# Patient Record
Sex: MALE | Race: OTHER | NOT HISPANIC OR LATINO | Employment: OTHER | URBAN - METROPOLITAN AREA
[De-identification: names, ages, dates, MRNs, and addresses within clinical notes are randomized per-mention and may not be internally consistent; named-entity substitution may affect disease eponyms.]

---

## 2018-09-19 ENCOUNTER — APPOINTMENT (EMERGENCY)
Dept: CT IMAGING | Facility: HOSPITAL | Age: 73
End: 2018-09-19

## 2018-09-19 ENCOUNTER — HOSPITAL ENCOUNTER (EMERGENCY)
Facility: HOSPITAL | Age: 73
Discharge: HOME | End: 2018-09-19
Attending: EMERGENCY MEDICINE
Payer: MEDICAID

## 2018-09-19 VITALS
HEART RATE: 99 BPM | HEIGHT: 62 IN | SYSTOLIC BLOOD PRESSURE: 114 MMHG | OXYGEN SATURATION: 95 % | RESPIRATION RATE: 20 BRPM | BODY MASS INDEX: 27.83 KG/M2 | DIASTOLIC BLOOD PRESSURE: 59 MMHG | WEIGHT: 151.24 LBS | TEMPERATURE: 98 F

## 2018-09-19 DIAGNOSIS — R33.9 URINARY RETENTION: Primary | ICD-10-CM

## 2018-09-19 LAB
ALBUMIN SERPL BCP-MCNC: 3.8 G/DL (ref 3.5–5)
ALP SERPL-CCNC: 209 U/L (ref 46–116)
ALT SERPL W P-5'-P-CCNC: 32 U/L (ref 12–78)
ANION GAP SERPL CALCULATED.3IONS-SCNC: 10 MMOL/L (ref 4–13)
AST SERPL W P-5'-P-CCNC: 22 U/L (ref 5–45)
ATRIAL RATE: 134 BPM
BACTERIA UR QL AUTO: ABNORMAL /HPF
BASOPHILS # BLD AUTO: 0.05 THOUSANDS/ΜL (ref 0–0.1)
BASOPHILS NFR BLD AUTO: 1 % (ref 0–1)
BILIRUB SERPL-MCNC: 0.8 MG/DL (ref 0.2–1)
BILIRUB UR QL STRIP: NEGATIVE
BUN SERPL-MCNC: 24 MG/DL (ref 5–25)
CALCIUM SERPL-MCNC: 8.7 MG/DL (ref 8.3–10.1)
CHLORIDE SERPL-SCNC: 101 MMOL/L (ref 100–108)
CLARITY UR: ABNORMAL
CO2 SERPL-SCNC: 28 MMOL/L (ref 21–32)
COLOR UR: YELLOW
CREAT SERPL-MCNC: 1.19 MG/DL (ref 0.6–1.3)
EOSINOPHIL # BLD AUTO: 0.13 THOUSAND/ΜL (ref 0–0.61)
EOSINOPHIL NFR BLD AUTO: 1 % (ref 0–6)
ERYTHROCYTE [DISTWIDTH] IN BLOOD BY AUTOMATED COUNT: 15 % (ref 11.6–15.1)
GFR SERPL CREATININE-BSD FRML MDRD: 61 ML/MIN/1.73SQ M
GLUCOSE SERPL-MCNC: 109 MG/DL (ref 65–140)
GLUCOSE UR STRIP-MCNC: NEGATIVE MG/DL
HCT VFR BLD AUTO: 42.4 % (ref 36.5–49.3)
HGB BLD-MCNC: 13.8 G/DL (ref 12–17)
HGB UR QL STRIP.AUTO: ABNORMAL
IMM GRANULOCYTES # BLD AUTO: 0.04 THOUSAND/UL (ref 0–0.2)
IMM GRANULOCYTES NFR BLD AUTO: 0 % (ref 0–2)
KETONES UR STRIP-MCNC: NEGATIVE MG/DL
LEUKOCYTE ESTERASE UR QL STRIP: NEGATIVE
LYMPHOCYTES # BLD AUTO: 2.06 THOUSANDS/ΜL (ref 0.6–4.47)
LYMPHOCYTES NFR BLD AUTO: 20 % (ref 14–44)
MCH RBC QN AUTO: 30 PG (ref 26.8–34.3)
MCHC RBC AUTO-ENTMCNC: 32.5 G/DL (ref 31.4–37.4)
MCV RBC AUTO: 92 FL (ref 82–98)
MONOCYTES # BLD AUTO: 1.38 THOUSAND/ΜL (ref 0.17–1.22)
MONOCYTES NFR BLD AUTO: 14 % (ref 4–12)
NEUTROPHILS # BLD AUTO: 6.56 THOUSANDS/ΜL (ref 1.85–7.62)
NEUTS SEG NFR BLD AUTO: 64 % (ref 43–75)
NITRITE UR QL STRIP: NEGATIVE
NON-SQ EPI CELLS URNS QL MICRO: ABNORMAL /HPF
NRBC BLD AUTO-RTO: 0 /100 WBCS
P AXIS: 8 DEGREES
PH UR STRIP.AUTO: 5.5 [PH] (ref 4.5–8)
PLATELET # BLD AUTO: 235 THOUSANDS/UL (ref 149–390)
PMV BLD AUTO: 10 FL (ref 8.9–12.7)
POTASSIUM SERPL-SCNC: 4.4 MMOL/L (ref 3.5–5.3)
PR INTERVAL: 192 MS
PROT SERPL-MCNC: 7.8 G/DL (ref 6.4–8.2)
PROT UR STRIP-MCNC: NEGATIVE MG/DL
QRS AXIS: 137 DEGREES
QRSD INTERVAL: 70 MS
QT INTERVAL: 270 MS
QTC INTERVAL: 403 MS
RBC # BLD AUTO: 4.6 MILLION/UL (ref 3.88–5.62)
RBC #/AREA URNS AUTO: ABNORMAL /HPF
SODIUM SERPL-SCNC: 139 MMOL/L (ref 136–145)
SP GR UR STRIP.AUTO: 1.01 (ref 1–1.03)
T WAVE AXIS: -8 DEGREES
UROBILINOGEN UR QL STRIP.AUTO: 0.2 E.U./DL
VENTRICULAR RATE: 134 BPM
WBC # BLD AUTO: 10.22 THOUSAND/UL (ref 4.31–10.16)
WBC #/AREA URNS AUTO: ABNORMAL /HPF

## 2018-09-19 PROCEDURE — 36415 COLL VENOUS BLD VENIPUNCTURE: CPT | Performed by: EMERGENCY MEDICINE

## 2018-09-19 PROCEDURE — 81001 URINALYSIS AUTO W/SCOPE: CPT | Performed by: EMERGENCY MEDICINE

## 2018-09-19 PROCEDURE — 85025 COMPLETE CBC W/AUTO DIFF WBC: CPT | Performed by: EMERGENCY MEDICINE

## 2018-09-19 PROCEDURE — 74177 CT ABD & PELVIS W/CONTRAST: CPT

## 2018-09-19 PROCEDURE — 93005 ELECTROCARDIOGRAM TRACING: CPT

## 2018-09-19 PROCEDURE — 80053 COMPREHEN METABOLIC PANEL: CPT | Performed by: EMERGENCY MEDICINE

## 2018-09-19 PROCEDURE — 93010 ELECTROCARDIOGRAM REPORT: CPT | Performed by: INTERNAL MEDICINE

## 2018-09-19 PROCEDURE — 99284 EMERGENCY DEPT VISIT MOD MDM: CPT

## 2018-09-19 RX ORDER — METHIMAZOLE 5 MG/1
5 TABLET ORAL 2 TIMES DAILY
COMMUNITY

## 2018-09-19 RX ORDER — FUROSEMIDE 20 MG/1
20 TABLET ORAL 2 TIMES DAILY
COMMUNITY

## 2018-09-19 RX ORDER — METOPROLOL SUCCINATE 25 MG/1
25 TABLET, EXTENDED RELEASE ORAL DAILY
COMMUNITY

## 2018-09-19 RX ADMIN — IOHEXOL 100 ML: 350 INJECTION, SOLUTION INTRAVENOUS at 17:13

## 2018-09-19 NOTE — ED NOTES
Spoke with patient over  and he verbally understood that he needs to contact his MD first thing tomorrow regarding the staples catheter that is placed  Spoke with son "Wilfred Norwood" and he is on his way here and going to be here in 45 min to  patient  Son verbally understood what the father needs to do as a followup and will see to that they call  MD states that we are to leave in the staples cath       Aisha Norris RN  09/19/18 6869

## 2018-09-19 NOTE — ED PROVIDER NOTES
History  Chief Complaint   Patient presents with    Difficulty Urinating     patient states that he is unable to urinate in that last 4 hours      80-year-old male presents for urinary retention  He states this has happened in the past secondary to enlarged prostate  States that for the past 4 hr he has been unable to urinate and he is developing pressure and bladder secondary to this  No perianal sensation loss  No numbness or tingling  He denies any fevers or chills, denies recent trauma to the abdomen  He denies dysuria or pain on urination, just has the inability to urinate at this time  No penile or testicular pain  Prior to Admission Medications   Prescriptions Last Dose Informant Patient Reported? Taking? Fluticasone Furoate-Vilanterol (BREO ELLIPTA IN)   Yes Yes   Sig: Inhale 1 Inhaler   Rivaroxaban (XARELTO PO)   Yes Yes   Sig: Take 15 mg by mouth daily   Tamsulosin HCl (FLOMAX PO)   Yes Yes   Sig: Take 0 4 mg by mouth daily   furosemide (LASIX) 20 mg tablet   Yes Yes   Sig: Take 20 mg by mouth 2 (two) times a day   methimazole (TAPAZOLE) 5 mg tablet   Yes Yes   Sig: Take 5 mg by mouth 2 (two) times a day   metoprolol succinate (TOPROL-XL) 25 mg 24 hr tablet   Yes Yes   Sig: Take 25 mg by mouth daily      Facility-Administered Medications: None       Past Medical History:   Diagnosis Date    Hypertension        History reviewed  No pertinent surgical history  History reviewed  No pertinent family history  I have reviewed and agree with the history as documented  Social History   Substance Use Topics    Smoking status: Never Smoker    Smokeless tobacco: Never Used    Alcohol use No        Review of Systems   Constitutional: Negative for chills and fever  HENT: Negative for congestion and rhinorrhea  Respiratory: Positive for shortness of breath (Chronic secondary to trauma as a child)  Negative for chest tightness and stridor      Cardiovascular: Negative for chest pain and leg swelling  Gastrointestinal: Negative for abdominal pain, nausea and vomiting  Genitourinary: Positive for difficulty urinating  Negative for discharge, dysuria, flank pain, penile pain and testicular pain  Musculoskeletal: Negative for back pain, neck pain and neck stiffness  Skin: Negative for rash and wound  Neurological: Negative for dizziness, weakness, numbness and headaches  Psychiatric/Behavioral: Negative for confusion  Physical Exam  Physical Exam   Constitutional: He is oriented to person, place, and time  He appears well-developed and well-nourished  No distress  HENT:   Head: Normocephalic and atraumatic  Mouth/Throat: Oropharynx is clear and moist    Eyes: Conjunctivae and EOM are normal    Neck: Normal range of motion  Neck supple  Cardiovascular: Regular rhythm  Mild tachycardia which is baseline for the patient  Pulmonary/Chest: Breath sounds normal  He is in respiratory distress (Baseline increased respiratory effort after a traumatic injury to his spine as a child  No change from baseline  )  He exhibits no tenderness  Abdominal: Soft  He exhibits no distension  There is no tenderness  There is no guarding  Genitourinary: Penis normal  No penile tenderness ( no penile discharge, rash, no abnormalities noted to the testicles  Taveras is placed without incident)  Musculoskeletal: Normal range of motion  Neurological: He is alert and oriented to person, place, and time  No cranial nerve deficit or sensory deficit  Skin: Skin is warm and dry  He is not diaphoretic  No pallor  Psychiatric: He has a normal mood and affect  His behavior is normal    Vitals reviewed        Vital Signs  ED Triage Vitals [09/19/18 1534]   Temperature Pulse Respirations Blood Pressure SpO2   98 °F (36 7 °C) (!) 124 22 (!) 149/105 92 %      Temp Source Heart Rate Source Patient Position - Orthostatic VS BP Location FiO2 (%)   Oral Monitor Lying Right arm --      Pain Score Worst Possible Pain           Vitals:    09/19/18 1534 09/19/18 1800 09/19/18 1805 09/19/18 1830   BP: (!) 149/105 109/64 109/64 114/59   Pulse: (!) 124 100 96 99   Patient Position - Orthostatic VS: Lying  Lying        Visual Acuity      ED Medications  Medications   iohexol (OMNIPAQUE) 350 MG/ML injection (MULTI-DOSE) 100 mL (100 mL Intravenous Given 9/19/18 1713)       Diagnostic Studies  Results Reviewed     Procedure Component Value Units Date/Time    Urine Microscopic [[id number]]  (Abnormal) Collected:  09/19/18 1622    Lab Status:  Final result Specimen:  Urine from Urine, Clean Catch Updated:  09/19/18 1639     RBC, UA 20-30 (A) /hpf      WBC, UA None Seen /hpf      Epithelial Cells None Seen /hpf      Bacteria, UA None Seen /hpf     UA w Reflex to Microscopic w Reflex to Culture [[id number]]  (Abnormal) Collected:  09/19/18 1622    Lab Status:  Final result Specimen:  Urine from Urine, Clean Catch Updated:  09/19/18 1630     Color, UA Yellow     Clarity, UA Slightly Cloudy     Specific [place], UA 1 010     pH, UA 5 5     Leukocytes, UA Negative     Nitrite, UA Negative     Protein, UA Negative mg/dl      Glucose, UA Negative mg/dl      Ketones, UA Negative mg/dl      Urobilinogen, UA 0 2 E U /dl      Bilirubin, UA Negative     Blood, UA Large (A)    Comprehensive metabolic panel [[id number]]  (Abnormal) Collected:  09/19/18 1600    Lab Status:  Final result Specimen:  Blood from Arm, Left Updated:  09/19/18 1626     Sodium 139 mmol/L      Potassium 4 4 mmol/L      Chloride 101 mmol/L      CO2 28 mmol/L      ANION GAP 10 mmol/L      BUN 24 mg/dL      Creatinine 1 19 mg/dL      Glucose 109 mg/dL      Calcium 8 7 mg/dL      AST 22 U/L      ALT 32 U/L      Alkaline Phosphatase 209 (H) U/L      Total Protein 7 8 g/dL      Albumin 3 8 g/dL      Total Bilirubin 0 80 mg/dL      eGFR 61 ml/min/1 73sq m     Narrative:         National Kidney Disease Education Program recommendations are as follows:  GFR calculation is accurate only with a steady state creatinine  Chronic Kidney disease less than 60 ml/min/1 73 sq  meters  Kidney failure less than 15 ml/min/1 73 sq  meters  CBC and differential [[id number]]  (Abnormal) Collected:  09/19/18 1600    Lab Status:  Final result Specimen:  Blood from Arm, Left Updated:  09/19/18 1606     WBC 10 22 (H) Thousand/uL      RBC 4 60 Million/uL      Hemoglobin 13 8 g/dL      Hematocrit 42 4 %      MCV 92 fL      MCH 30 0 pg      MCHC 32 5 g/dL      RDW 15 0 %      MPV 10 0 fL      Platelets 481 Thousands/uL      nRBC 0 /100 WBCs      Neutrophils Relative 64 %      Immat GRANS % 0 %      Lymphocytes Relative 20 %      Monocytes Relative 14 (H) %      Eosinophils Relative 1 %      Basophils Relative 1 %      Neutrophils Absolute 6 56 Thousands/µL      Immature Grans Absolute 0 04 Thousand/uL      Lymphocytes Absolute 2 06 Thousands/µL      Monocytes Absolute 1 38 (H) Thousand/µL      Eosinophils Absolute 0 13 Thousand/µL      Basophils Absolute 0 05 Thousands/µL                  CT abdomen pelvis with contrast   ED Interpretation by Erwin Morrow DO (09/19 1753)   Extreme focal kyphosis lower thoracic spine/thoracolumbar junction with multiple fused vertebral bodies  Narrowing of the thecal sac as it crosses posterior to the extremely kyphotic lower thoracic segment        Small pleural effusions        Mild cardiomegaly        Enlarged prostate        Urinary bladder is collapsed by Taveras catheter  Final Result by Gin Stahl DO (09/19 1750)   Extreme focal kyphosis lower thoracic spine/thoracolumbar junction with multiple fused vertebral bodies  Narrowing of the thecal sac as it crosses posterior to the extremely kyphotic lower thoracic segment  Small pleural effusions  Mild cardiomegaly  Enlarged prostate  Urinary bladder is collapsed by Taveras catheter        Workstation performed: [id number]                    Procedures  Procedures       Phone Contacts  ED Phone Contact    ED Course  ED Course as of Sep 20 1513   Wed Sep 19, 2018   1644 No evidence of infection urine  1644 No evidence of acute kidney disease  MDM  Number of Diagnoses or Management Options  Urinary retention:   Diagnosis management comments: Urinary retention  No loss of sensation in the perirectal area  No concern for cauda equina  Patient has a history of BPH and states this has happened in the past   He has a catheter placed  Blood work was performed to ensure normal kidney function  There is no evidence of urinary tract infection, only blood in the urine likely secondary to traumatic placement of the Staples  CT scan does not indicate any other signs of infection or other urinary disease  Patient is advised follow-up with Urology in has the Staples left in place until Urology follow-up  Amount and/or Complexity of Data Reviewed  Clinical lab tests: ordered and reviewed  Tests in the radiology section of CPT®: ordered and reviewed      CritCare Time    Disposition  Final diagnoses:   Urinary retention     Time reflects when diagnosis was documented in both MDM as applicable and the Disposition within this note     Time User Action Codes Description Comment    9/19/2018  5:49 PM Pedro Luis Irvin Laramie Add [R33 9] Urinary retention       ED Disposition     ED Disposition Condition Comment    Discharge  Suhail Lam discharge to home/self care      Condition at discharge: Good        Follow-up Information     Follow up With Specialties Details Why Contact Info Additional [address] [facility] Emergency Department Emergency Medicine  If symptoms worsen: fever,chills, abdominal pain, unable to urinate through staples, etc [address] [facility] [address] ED, [address] [place] [address], [phone]    Ariana Blancas MD Urology Schedule an appointment as soon as possible for a visit [address]  [phone]             Discharge Medication List as of 9/19/2018  5:52 PM      CONTINUE these medications which have NOT CHANGED    Details   Fluticasone Furoate-Vilanterol (BREO ELLIPTA IN) Inhale 1 Inhaler, Historical Med      furosemide (LASIX) 20 mg tablet Take 20 mg by mouth 2 (two) times a day, Historical Med      methimazole (TAPAZOLE) 5 mg tablet Take 5 mg by mouth 2 (two) times a day, Historical Med      metoprolol succinate (TOPROL-XL) 25 mg 24 hr tablet Take 25 mg by mouth daily, Historical Med      Rivaroxaban (XARELTO PO) Take 15 mg by mouth daily, Historical Med      Tamsulosin HCl (FLOMAX PO) Take 0 4 mg by mouth daily, Historical Med           No discharge procedures on file      ED Provider  Electronically Signed by           Kim Mann DO  09/20/18 9675

## 2018-09-19 NOTE — DISCHARGE INSTRUCTIONS
Urinary Retention in Men   WHAT YOU NEED TO KNOW:   What is urinary retention? Urinary retention is a condition that develops when your bladder does not empty completely when you urinate  What causes urinary retention? · An enlarged prostate    · Blockages, such as a stone, growth, or narrowing of your urethra    · A weak bladder muscle    · Nerve damage from diabetes, stroke, or spinal cord injury    · Bladder diverticula, which are pockets of urine that form in your bladder and do not empty    · Certain medicines, such as narcotics, antihistamines, or antidepressants  What are the signs and symptoms of urinary retention? · Frequent urination, or the urge to urinate right after you finish    · An urge to urinate, but your urine does not come out or dribbles out slowly and weakly    · Frequent urine leaks that happen during the day or while you sleep    · Pain or pressure when you urinate    · Pain or stiffness in your abdomen, lower back, hips, or upper thighs    · Blood in your urine  How is urinary retention diagnosed? Your healthcare provider will ask about your health history and the medicines you take  He will press or tap on your lower abdomen  You may need any of the following tests:  · A digital rectal exam  is when healthcare providers carefully feel the size of your prostate  · A post void residual  test will show how much urine is left in your bladder after you urinate  You will be asked to urinate and then healthcare providers will use a small ultrasound machine to check how much urine is left in your bladder  · Blood or urine tests  may show infection or prostate specific antigen (PSA) levels  PSA may be elevated in prostate cancer  · An ultrasound  uses sound waves to show pictures on a monitor  An ultrasound may be done to show bladder stones, infection, or other problems  · A CT scan , or CAT scan, is a type of x-ray that is taken of your prostate, kidneys, and bladder  The pictures may show what is causing your urinary retention  You may be given a dye before the pictures are taken to help healthcare providers see the pictures better  Tell the healthcare provider if you have ever had an allergic reaction to contrast dye  How is urinary retention treated? · A Taveras catheter  is a tube put into your bladder to drain urine into a bag  Keep the bag below your waist  This will prevent urine from flowing back into your bladder and causing an infection or other problems  Also, keep the tube free of kinks so the urine will drain properly  Do not pull on the catheter  This can cause pain and bleeding, and may cause the catheter to come out  · Medicines  can help decrease the size of your prostate, fight infection, and help you urinate more easily  · Surgery  may be needed to treat the condition that is causing your urinary retention  When should I contact my healthcare provider? · You have a fever  · You have pain when you urinate  · You have blood in your urine  · You have problems with your catheter  · You have questions or concerns about your condition or care  When should I seek immediate care or call 911? · You have severe abdominal pain  · You are breathing faster than usual     · Your heartbeat is faster than usual     · Your face, hands, feet, or ankles are swollen  CARE AGREEMENT:   You have the right to help plan your care  Learn about your health condition and how it may be treated  Discuss treatment options with your caregivers to decide what care you want to receive  You always have the right to refuse treatment  The above information is an  only  It is not intended as medical advice for individual conditions or treatments  Talk to your doctor, nurse or pharmacist before following any medical regimen to see if it is safe and effective for you    © 2017 [employer] Information is for End User's use only and may not be sold, redistributed or otherwise used for commercial purposes  All illustrations and images included in CareNotes® are the copyrighted property of A D A M [employer]  or Chuck Barber  Taveras Catheter Placement and Care   WHAT YOU NEED TO KNOW:   A Taveras catheter is a sterile tube that is inserted into your bladder to drain urine  It is also called an indwelling urinary catheter  The tip of the catheter has a small balloon filled with solution that holds the catheter inside your bladder  DISCHARGE INSTRUCTIONS:   Return to the emergency department if:   · Your catheter comes out  · You suddenly have material that looks like sand in the tubing or drainage bag  · No urine is draining into the bag and you have checked the system  · You have pain in your hip, back, pelvis, or lower abdomen  · You are confused or cannot think clearly  Contact your healthcare provider if:   · You have a fever  · You have bladder spasms for more than 1 day after the catheter is placed  · You see blood in the tubing or drainage bag  · You have a rash or itching where the catheter tube is secured to your skin  · Urine leaks from or around the catheter, tubing, or drainage bag  · The closed drainage system has accidently come open or apart  · You see a layer of crystals inside the tubing  · You have questions or concerns about your condition or care  Care for your Taveras catheter:   · Clean your genital area 2 times every day  Clean your catheter and the area around where it was inserted  Use soap and water  Clean your anal opening and catheter area after every bowel movement  · Secure the catheter tube  so you do not pull or move the catheter  This helps prevent pain and bladder spasms  Healthcare providers will show you how to use medical tape or a strap to secure the catheter tube to your body  · Keep a closed drainage system    Your Taveras catheter should always be attached to the drainage bag to form a closed system  Do not disconnect any part of the closed system unless you need to change the bag  Care for your drainage bag:   · Ask if a leg bag is right for you  A leg bag can be worn under your clothes  Ask your healthcare provider for more information about a leg bag  · Keep the drainage bag below the level of your waist   This helps stop urine from moving back up the tubing and into your bladder  Do not loop or kink the tubing  This can cause urine to back up and collect in your bladder  Do not let the drainage bag touch or lie on the floor  · Empty the drainage bag when needed  The weight of a full drainage bag can be painful  Empty the drainage bag every 3 to 6 hours or when it is ? full  · Clean and change the drainage bag as directed  Ask your healthcare provider how often you should change the drainage bag and what cleaning solution to use  Wear disposable gloves when you change the bag  Do not allow the end of the catheter or tubing to touch anything  Clean the ends with an alcohol pad before you reconnect them  What to do if problems develop:   · No urine is draining into the bag:      ¨ Check for kinks in the tubing and straighten them out  ¨ Check the tape or strap used to secure the catheter tube to your skin  Make sure it is not blocking the tube  ¨ Make sure you are not sitting or lying on the tubing  ¨ Make sure the urine bag is hanging below the level of your waist     · Urine leaks from or around the catheter, tubing, or drainage bag:  Check if the closed drainage system has accidently come open or apart  Clean the catheter and tubing ends with a new alcohol pad and reconnect them  Prevent an infection:   · Wash your hands often  Wash before and after you touch your catheter, tubing, or drainage bag  Use soap and water  Wear clean disposable gloves when you care for your catheter or disconnect the drainage bag   Wash your hands before you prepare or eat food  · Drink liquids as directed  Ask your healthcare provider how much liquid to drink each day and which liquids are best for you  Liquids will help flush your kidneys and bladder to help prevent infection  Follow up with your healthcare provider as directed:  Write down your questions so you remember to ask them during your visits  © 2017 [address] [employer] Information is for End User's use only and may not be sold, redistributed or otherwise used for commercial purposes  All illustrations and images included in CareNotes® are the copyrighted property of [employer]  or Chuck Barber  The above information is an  only  It is not intended as medical advice for individual conditions or treatments  Talk to your doctor, nurse or pharmacist before following any medical regimen to see if it is safe and effective for you

## 2018-10-04 ENCOUNTER — HOSPITAL ENCOUNTER (EMERGENCY)
Facility: HOSPITAL | Age: 73
Discharge: HOME | End: 2018-10-04
Attending: EMERGENCY MEDICINE | Admitting: EMERGENCY MEDICINE
Payer: MEDICAID

## 2018-10-04 VITALS
WEIGHT: 149.91 LBS | HEART RATE: 95 BPM | OXYGEN SATURATION: 93 % | BODY MASS INDEX: 27.42 KG/M2 | RESPIRATION RATE: 15 BRPM | TEMPERATURE: 98.6 F | DIASTOLIC BLOOD PRESSURE: 90 MMHG | SYSTOLIC BLOOD PRESSURE: 146 MMHG

## 2018-10-04 DIAGNOSIS — B02.9 HERPES ZOSTER: Primary | ICD-10-CM

## 2018-10-04 PROCEDURE — 99283 EMERGENCY DEPT VISIT LOW MDM: CPT

## 2018-10-04 RX ORDER — VALACYCLOVIR HYDROCHLORIDE 1 G/1
1000 TABLET, FILM COATED ORAL 3 TIMES DAILY
Qty: 21 TABLET | Refills: 0 | Status: SHIPPED | OUTPATIENT
Start: 2018-10-04 | End: 2018-10-04

## 2018-10-04 RX ORDER — VALACYCLOVIR HYDROCHLORIDE 1 G/1
1000 TABLET, FILM COATED ORAL 3 TIMES DAILY
Qty: 21 TABLET | Refills: 0 | Status: SHIPPED | OUTPATIENT
Start: 2018-10-04 | End: 2018-10-11

## 2018-10-04 RX ORDER — VALACYCLOVIR HYDROCHLORIDE 500 MG/1
1000 TABLET, FILM COATED ORAL ONCE
Status: COMPLETED | OUTPATIENT
Start: 2018-10-04 | End: 2018-10-04

## 2018-10-04 RX ORDER — ERYTHROMYCIN 5 MG/G
0.5 OINTMENT OPHTHALMIC ONCE
Status: COMPLETED | OUTPATIENT
Start: 2018-10-04 | End: 2018-10-04

## 2018-10-04 RX ORDER — ERYTHROMYCIN 5 MG/G
OINTMENT OPHTHALMIC
Qty: 3.5 G | Refills: 0 | Status: SHIPPED | OUTPATIENT
Start: 2018-10-04

## 2018-10-04 RX ORDER — ERYTHROMYCIN 5 MG/G
OINTMENT OPHTHALMIC
Qty: 3.5 G | Refills: 0 | Status: SHIPPED | OUTPATIENT
Start: 2018-10-04 | End: 2018-10-04

## 2018-10-04 RX ADMIN — VALACYCLOVIR HYDROCHLORIDE 1000 MG: 500 TABLET, FILM COATED ORAL at 22:24

## 2018-10-04 RX ADMIN — ERYTHROMYCIN 0.5 INCH: 5 OINTMENT OPHTHALMIC at 22:31

## 2018-10-05 NOTE — DISCHARGE INSTRUCTIONS
Please followup with opthamology  Please return if any worsening of symptoms, vision changes, worsening rash, fever, headaches  Take the antivirals as described  followup with your pcp for reevaluation as well  Shingles   WHAT YOU NEED TO KNOW:   What is shingles? Shingles is a painful rash  Shingles is caused by the same virus that causes chickenpox (varicella-zoster)  After you get chickenpox, the virus stays in your body for several years without causing any symptoms  Shingles occurs when the virus becomes active again  Once active, the virus will travel along a nerve to your skin and cause a rash  What are the signs and symptoms of shingles? Shingles often starts with pain in the back, chest, neck, or face  A rash then develops in the same area  The rash is usually found on only one side of the body  The rash may feel itchy or painful  It starts as red dots that become blisters filled with fluid  The blisters usually grow bigger, become filled with pus, and then crust over after a few days  You may also have any of the following:  · Fatigue and muscle weakness    · Pain when your skin is lightly touched    · Headache    · Fever    · Eye pain when exposed to light  What increases my risk for shingles? · Age older than 48    · Exposure to the virus while your mother was pregnant with you    · A medical condition such as cancer, AIDS, or Hodgkin disease     · Treatment for cancer that decreases your immune system    · Stressful life events that weaken your immune system    · An organ or stem cell transplant  How is shingles diagnosed? Your healthcare provider will ask about your symptoms  Tell him if you have had chickenpox  Tell him if you have recently been around anyone who has chickenpox or shingles  The appearance of your rash will usually be enough for your healthcare provider to know you have shingles  He may also send skin scrapings or fluid from your blisters for tests     How is shingles treated? · Antiviral medicine  helps decrease symptoms and healing time  It may also decrease your risk for nerve pain  You will need to start taking this medicine within 3 days of the start of symptoms to prevent nerve pain  · Pain medicine  may be prescribed or suggested by your healthcare provider  You may need NSAIDs, acetaminophen, or opioid medicine depending on how much pain you are in  · Topical anesthetics  are used to numb the skin and decrease pain  They can be a cream, gel, spray, or patch  · Anticonvulsants  decrease nerve pain and may help you sleep at night  · Antidepressants  may be used to decrease nerve pain  · Epidural medicine  is put into your spine to block pain  This medicine treats severe pain that does not get better with other pain medicines  Epidural medicine includes numbing medicine and steroids  Can I infect others? The virus can be passed to a person who has never had chickenpox  This person may get chickenpox, but not shingles  You may pass the virus to others as long as you have a rash  The virus is spread by direct contact with the fluid from the blisters  Usually, you cannot spread the virus once the blisters dry up  What are the risks of shingles? If left untreated, shingles may cause eye problems, such as a drooping eyelid or blindness  It may lead to a brain infection or stroke  Shingles can also cause nerve damage and lead to twitching, dizziness, or loss of taste and hearing  The blisters may leave scars or changes in skin color  Shingles may cause pain even after the rash is gone  It may also lead to trouble moving parts of your body  How can I care for myself? Keep your rash clean and dry  Cover your rash with a bandage or clothing  Do not use bandages with adhesive  These may irritate your skin and make your rash last longer  What can I do to help prevent shingles or a shingles outbreak? A vaccine may be given to help prevent shingles   Ask for more information about this vaccine  Where can I find more information about shingles? · Centers for Disease Control and Prevention  [address] , [address]  Phone: 1- 062 - 9859495  Phone: 4- 414 - [phone]  Web Address: [employer] com   When should I seek immediate care? · You have painful, red, warm skin around the blisters, or the blisters drain pus  · Your neck is stiff or you have trouble moving it  · You have trouble moving your arms, legs, or face  · You have a seizure  · You have weakness in an arm or leg  · You become confused, or have difficulty speaking  · You have dizziness, a severe headache, or hearing or vision loss  When should I contact my healthcare provider? · You feel weak or have a headache  · You have a cough, chills, or a fever  · You have abdominal pain or nausea, or you are vomiting  · Your rash becomes more itchy or painful  · Your rash spreads to other parts of your body  · Your pain worsens and does not go away even after you take medicine  · You have questions or concerns about your condition or care  CARE AGREEMENT:   You have the right to help plan your care  Learn about your health condition and how it may be treated  Discuss treatment options with your caregivers to decide what care you want to receive  You always have the right to refuse treatment  The above information is an  only  It is not intended as medical advice for individual conditions or treatments  Talk to your doctor, nurse or pharmacist before following any medical regimen to see if it is safe and effective for you  © 2017 [address] [employer] Information is for End User's use only and may not be sold, redistributed or otherwise used for commercial purposes  All illustrations and images included in CareNotes® are the copyrighted property of [employer]  or Chuck [employer]

## 2018-10-05 NOTE — ED PROVIDER NOTES
History  Chief Complaint   Patient presents with    Rash     Red, raised rash on right side of face and scalp, painful with movement and occasionally itchy  Onset yesterday, unresponsive to benadryl at home  Denies fevers  HPI    This is a 68 year male that presents today with a rash on the right side of his face  Patient states since yesterday evening he fell vesicles on the top portion of his forehead  He states painful and occasionally itchy  He has been placing Benadryl along with steroid cream with no response  Patient denies any visual changes  No hearing loss  Denies any fevers or chills  Patient is not a diabetic states he is healthy at baseline  Currently states he has been taking ibuprofen for pain  Denies any other symptoms  No eye pain  No vesicles over the nose  68 year male that presents today with appears to be shingles  Patient does have some redness of his right eye without any pain  Patient has no consensual photophobia  Normal extraocular movements  Normal pupil  Patient has no lesions around his nose or inner ear  Will treat with acyclovir since patient is within the 72 hour window  Patient needs follow-up with Ophthalmology which I discussed with the patient  Will give drops conjunctivitis  Advised to return if any worsening of symptoms  Prior to Admission Medications   Prescriptions Last Dose Informant Patient Reported? Taking?    Fluticasone Furoate-Vilanterol (BREO ELLIPTA IN)   Yes Yes   Sig: Inhale 1 Inhaler   Rivaroxaban (XARELTO PO)   Yes Yes   Sig: Take 15 mg by mouth daily   Tamsulosin HCl (FLOMAX PO)   Yes Yes   Sig: Take 0 4 mg by mouth daily   furosemide (LASIX) 20 mg tablet   Yes Yes   Sig: Take 20 mg by mouth 2 (two) times a day   methimazole (TAPAZOLE) 5 mg tablet   Yes Yes   Sig: Take 5 mg by mouth 2 (two) times a day   metoprolol succinate (TOPROL-XL) 25 mg 24 hr tablet   Yes Yes   Sig: Take 25 mg by mouth daily      Facility-Administered Medications: None       Past Medical History:   Diagnosis Date    Atrial fibrillation ([address] )     [facility]     Disease of thyroid gland     Hypertension     Kyphosis        History reviewed  No pertinent surgical history  History reviewed  No pertinent family history  I have reviewed and agree with the history as documented  Social History   Substance Use Topics    Smoking status: Never Smoker    Smokeless tobacco: Never Used    Alcohol use No        Review of Systems   Constitutional: Negative  Negative for diaphoresis and fever  HENT: Negative  Respiratory: Negative  Negative for cough, shortness of breath and wheezing  Cardiovascular: Negative  Negative for chest pain, palpitations and leg swelling  Gastrointestinal: Negative for abdominal distention, abdominal pain, nausea and vomiting  Genitourinary: Negative  Musculoskeletal: Negative  Skin: Positive for rash  Neurological: Negative  Psychiatric/Behavioral: Negative  All other systems reviewed and are negative  Physical Exam  Physical Exam   Constitutional: He is oriented to person, place, and time  He appears well-developed and well-nourished  No distress  HENT:   Head: Normocephalic and atraumatic  Nose: Nose normal    Mouth/Throat: Oropharynx is clear and moist    Right upper forehead vesicles with some crusting extending from the eyebrow to the scalp  No vesicles appreciated on the ear outside and inside  No vesicles of the nose  Patient has conjunctivitis of his right eye with normal visual fields  No pain with extraocular movements  Pain with light  Patient has no visual deficits  Normal visual fields  No other lesions appreciated  Eyes: Pupils are equal, round, and reactive to light  Conjunctivae and EOM are normal    Neck: Normal range of motion  Neck supple  Cardiovascular: Normal rate, regular rhythm and normal heart sounds      No murmur heard   Pulmonary/Chest: Effort normal and breath sounds normal  No respiratory distress  He has no wheezes  He has no rales  Abdominal: Soft  Bowel sounds are normal  He exhibits no distension  There is no tenderness  There is no rebound and no guarding  Musculoskeletal: Normal range of motion  He exhibits no edema, tenderness or deformity  Neurological: He is alert and oriented to person, place, and time  No cranial nerve deficit  Skin: Skin is warm and dry  No rash noted  He is not diaphoretic  No pallor  Psychiatric: He has a normal mood and affect  Vitals reviewed  Vital Signs  ED Triage Vitals [10/04/18 2213]   Temperature Pulse Respirations Blood Pressure SpO2   98 6 °F (37 °C) 95 15 146/90 93 %      Temp Source Heart Rate Source Patient Position - Orthostatic VS BP Location FiO2 (%)   Oral Monitor Lying Right arm --      Pain Score       4           Vitals:    10/04/18 2213   BP: 146/90   Pulse: 95   Patient Position - Orthostatic VS: Lying       Visual Acuity  Visual Acuity      Most Recent Value   Visual acuity R eye is  20/40   Visual acuity Left eye is  20/40   Visual acuity in both eyes is  20/40   Wearing corrective eyewear/lenses? No [Pt states he wers glasses normally but did not wear them tonight ]          ED Medications  Medications   valACYclovir (VALTREX) tablet 1,000 mg (1,000 mg Oral Given 10/4/18 2224)   erythromycin (ILOTYCIN) 0 5 % ophthalmic ointment 0 5 inch (0 5 inches Right Eye Given 10/4/18 2231)       Diagnostic Studies  Results Reviewed     None                 No orders to display              Procedures  Procedures       Phone Contacts  ED Phone Contact    ED Course  ED Course as of Oct 05 0557   Thu Oct 04, 2018   1031 I had a lengthy discussion with the patient son who speaks [place]  Discussed return if patient has visual deficits not feeling better  If patient progressed on to have fevers or headaches    Patient currently is in no acute distress has a normal neuro exam   Will discharge patient patient will follow up with Ophthalmology which I gave her information regarding  Will give erythromycin ointment  Identification of Seniors at Risk      Most Recent Value   (ISAR) Identification of Seniors at Risk   Before the illness or injury that brought you to the Emergency, did you need someone to help you on a regular basis? 0 Filed at: 10/04/2018 2208   In the last 24 hours, have you needed more help than usual?  0 Filed at: 10/04/2018 2208   Have you been hospitalized for one or more nights during the past 6 months? 0 Filed at: 10/04/2018 2208   In general, do you see well?  0 Filed at: 10/04/2018 2208   In general, do you have serious problems with your memory? 0 Filed at: 10/04/2018 2208   Do you take more than three different medications every day?  0 Filed at: 10/04/2018 2208   ISAR Score  0 Filed at: 10/04/2018 2208                          [facility]  CritCare Time    Disposition  Final diagnoses:   Herpes zoster     Time reflects when diagnosis was documented in both MDM as applicable and the Disposition within this note     Time User Action Codes Description Comment    10/4/2018 10:46 PM Monica Vee 62 Add [B02 9] Herpes zoster       ED Disposition     ED Disposition Condition Comment    Discharge  Jose Ruiz discharge to home/self care  Condition at discharge: Good        Follow-up Information     Follow up With Specialties Details Why [address] Ophthalmology Schedule an appointment as soon as possible for a visit  [address]  [phone]            Discharge Medication List as of 10/4/2018 10:49 PM      START taking these medications    Details   erythromycin (ILOTYCIN) ophthalmic ointment Place a 1/2 inch ribbon of ointment into the lower eyelid  , Print      valACYclovir (VALTREX) 1,000 mg tablet Take 1 tablet (1,000 mg total) by mouth 3 (three) times a day for 7 days, Starting Thu 10/4/2018, Until u 10/11/2018, Print         CONTINUE these medications which have NOT CHANGED    Details   Fluticasone Furoate-Vilanterol (BREO ELLIPTA IN) Inhale 1 Inhaler, Historical Med      furosemide (LASIX) 20 mg tablet Take 20 mg by mouth 2 (two) times a day, Historical Med      methimazole (TAPAZOLE) 5 mg tablet Take 5 mg by mouth 2 (two) times a day, Historical Med      metoprolol succinate (TOPROL-XL) 25 mg 24 hr tablet Take 25 mg by mouth daily, Historical Med      Rivaroxaban (XARELTO PO) Take 15 mg by mouth daily, Historical Med      Tamsulosin HCl (FLOMAX PO) Take 0 4 mg by mouth daily, Historical Med           No discharge procedures on file      ED Provider  Electronically Signed by           Jd Childers MD  10/05/18 7251

## 2019-04-01 ENCOUNTER — HOSPITAL ENCOUNTER (EMERGENCY)
Facility: HOSPITAL | Age: 74
Discharge: HOME | End: 2019-04-01
Attending: EMERGENCY MEDICINE
Payer: MEDICAID

## 2019-04-01 VITALS
BODY MASS INDEX: 27.59 KG/M2 | SYSTOLIC BLOOD PRESSURE: 171 MMHG | HEIGHT: 62 IN | WEIGHT: 149.91 LBS | OXYGEN SATURATION: 93 % | HEART RATE: 96 BPM | TEMPERATURE: 97.6 F | RESPIRATION RATE: 18 BRPM | DIASTOLIC BLOOD PRESSURE: 80 MMHG

## 2019-04-01 DIAGNOSIS — R33.8 ACUTE URINARY RETENTION: Primary | ICD-10-CM

## 2019-04-01 LAB
BACTERIA UR QL AUTO: ABNORMAL /HPF
BILIRUB UR QL STRIP: NEGATIVE
CLARITY UR: CLEAR
COLOR UR: YELLOW
GLUCOSE UR STRIP-MCNC: NEGATIVE MG/DL
HGB UR QL STRIP.AUTO: ABNORMAL
KETONES UR STRIP-MCNC: NEGATIVE MG/DL
LEUKOCYTE ESTERASE UR QL STRIP: NEGATIVE
NITRITE UR QL STRIP: NEGATIVE
NON-SQ EPI CELLS URNS QL MICRO: ABNORMAL /HPF
PH UR STRIP.AUTO: 5 [PH]
PROT UR STRIP-MCNC: NEGATIVE MG/DL
RBC #/AREA URNS AUTO: ABNORMAL /HPF
SP GR UR STRIP.AUTO: 1.02 (ref 1–1.03)
UROBILINOGEN UR QL STRIP.AUTO: 0.2 E.U./DL
WBC #/AREA URNS AUTO: ABNORMAL /HPF

## 2019-04-01 PROCEDURE — 87086 URINE CULTURE/COLONY COUNT: CPT | Performed by: EMERGENCY MEDICINE

## 2019-04-01 PROCEDURE — 81001 URINALYSIS AUTO W/SCOPE: CPT | Performed by: EMERGENCY MEDICINE

## 2019-04-01 PROCEDURE — 99283 EMERGENCY DEPT VISIT LOW MDM: CPT

## 2019-04-01 PROCEDURE — 99283 EMERGENCY DEPT VISIT LOW MDM: CPT | Performed by: EMERGENCY MEDICINE

## 2019-04-02 LAB — BACTERIA UR CULT: NORMAL
